# Patient Record
Sex: MALE | Race: WHITE | URBAN - METROPOLITAN AREA
[De-identification: names, ages, dates, MRNs, and addresses within clinical notes are randomized per-mention and may not be internally consistent; named-entity substitution may affect disease eponyms.]

---

## 2024-04-05 ENCOUNTER — CLINICAL DOCUMENTATION (OUTPATIENT)
Age: 49
End: 2024-04-05

## 2024-04-05 NOTE — PROGRESS NOTES
Pt dropped off leave forms to be filled out by Dr Woodward. They have been scanned into his chart and placed in the hv bin.